# Patient Record
Sex: FEMALE | Race: WHITE | Employment: FULL TIME | URBAN - METROPOLITAN AREA
[De-identification: names, ages, dates, MRNs, and addresses within clinical notes are randomized per-mention and may not be internally consistent; named-entity substitution may affect disease eponyms.]

---

## 2019-06-21 ENCOUNTER — TRANSFERRED RECORDS (OUTPATIENT)
Dept: HEALTH INFORMATION MANAGEMENT | Facility: CLINIC | Age: 34
End: 2019-06-21

## 2019-12-18 ENCOUNTER — TRANSFERRED RECORDS (OUTPATIENT)
Dept: HEALTH INFORMATION MANAGEMENT | Facility: CLINIC | Age: 34
End: 2019-12-18

## 2019-12-19 DIAGNOSIS — R05.3 PERSISTENT COUGH: Primary | ICD-10-CM

## 2019-12-20 NOTE — TELEPHONE ENCOUNTER
RECORDS RECEIVED FROM: in process   DATE RECEIVED: 2.21.20   NOTES STATUS DETAILS   OFFICE NOTE from referring provider In process 12/19/19 per appt notes [facility]   OFFICE NOTE from other specialist In process    DISCHARGE SUMMARY from hospital In process    DISCHARGE REPORT from the ER In process    MEDICATION LIST In process    IMAGING  (NEED IMAGES AND REPORTS)     CT SCAN In process    CHEST XRAY (CXR) In process  Received- 2.21.20- scheduled  Received- [facility]- 1/7/20, 6/21/19,    TESTS     PULMONARY FUNCTION TESTING (PFT) received  12/18/19- [facility]       Action 12.20.19 sv   Action Taken Called pt and LVM about getting records. Will contact pt again on Monday 12/23/19        Action 12.30.19 sv   Action Taken Called pt and LVM about getting info/records, left call back number         Action 1.9.20 sv   Action Taken Called pt for the 3rd time and LVM and direct call back         Action 1.10.20 sv   Action Taken Records request sent to [facility] [phone]        Action 1.14.20 sv   Action Taken Received reports from [facility], sent to [facility], images are located at [facility], will contact to get images    7:55am- images received   Message sent to scheduling team to cancel CXR 2.21.20 due to most recent cxr received 1/7/20

## 2020-01-07 ENCOUNTER — TRANSFERRED RECORDS (OUTPATIENT)
Dept: HEALTH INFORMATION MANAGEMENT | Facility: CLINIC | Age: 35
End: 2020-01-07

## 2020-01-21 ENCOUNTER — HOSPITAL ENCOUNTER (OUTPATIENT)
Dept: GENERAL RADIOLOGY | Facility: CLINIC | Age: 35
Discharge: HOME | End: 2020-01-21
Attending: OTOLARYNGOLOGY | Admitting: OTOLARYNGOLOGY
Payer: COMMERCIAL

## 2020-01-21 DIAGNOSIS — R05.9 COUGH: ICD-10-CM

## 2020-01-21 DIAGNOSIS — R13.10 DYSPHAGIA: ICD-10-CM

## 2020-01-21 PROCEDURE — 74220 X-RAY XM ESOPHAGUS 1CNTRST: CPT

## 2020-01-21 PROCEDURE — 25500045 ZZH RX 255: Performed by: RADIOLOGY

## 2020-01-21 RX ADMIN — ANTACID/ANTIFLATULENT 4 G: 380; 550; 10; 10 GRANULE, EFFERVESCENT ORAL at 08:20

## 2020-02-13 ENCOUNTER — THERAPY VISIT (OUTPATIENT)
Dept: SPEECH THERAPY | Facility: CLINIC | Age: 35
End: 2020-02-13

## 2020-02-13 ENCOUNTER — ANCILLARY PROCEDURE (OUTPATIENT)
Dept: GENERAL RADIOLOGY | Facility: CLINIC | Age: 35
End: 2020-02-13
Attending: OTOLARYNGOLOGY

## 2020-02-13 DIAGNOSIS — R05.9 COUGH: ICD-10-CM

## 2020-02-13 DIAGNOSIS — R13.12 OROPHARYNGEAL DYSPHAGIA: Primary | ICD-10-CM

## 2020-02-13 DIAGNOSIS — R47.02 DYSPHASIA: ICD-10-CM

## 2020-02-13 RX ORDER — BARIUM SULFATE 400 MG/ML
30 SUSPENSION ORAL ONCE
Status: COMPLETED | OUTPATIENT
Start: 2020-02-13 | End: 2020-02-13

## 2020-02-13 RX ADMIN — BARIUM SULFATE 30 ML: 400 SUSPENSION ORAL at 09:53

## 2020-02-13 NOTE — PROGRESS NOTES
"   02/13/20 1000   General Information   Type Of Visit Initial   Referring Physician Carlos A Rubio MD   Orders Evaluate And Treat   Orders Comment SLP clinic eval, videofluoroscopy   Onset Of Illness/injury Or Date Of Surgery 03/01/18   Precautions/limitations No Known Precautions/limitations   Pertinent History of Current Problem/OT: Additional Occupational Profile Info Pt is a 34 year old female with concerns of continuous coughing and throat clearing starting in March 2018 after an \"illness\" and has continued since. Pt reported continuous throat clearing that worsens after eating.  Pt is on a regular diet and expresses \"sticky\" foods more difficult to eat.  Pt reported an occasional senstation of food getting stuck and food going down the wrong tube was expressed.  Pt reported making sure to have water with intake and that she \"chokes on her food more than others.\"  Pt reported having difficulty swallowing pills since she was a child and that she has a sensation of choking while singing.  Pt history negative for recent PNA; pt reports multiple CXR 's due to her symptoms.  As part of her current work up, pt had an esophagram completed which was negative.  Pt plans to pursue further workup with Pulmonology service next week and return to her referring provider (ENT).  Of note, pt observed to have baseline throat clearing behavior.         Clinical Swallow Evaluation   Oral Musculature generally intact   Structural Abnormalities none present   Dentition present and adequate   Mucosal Quality good   Mandibular Strength and Mobility intact   Oral Labial Strength and Mobility WFL   Lingual Strength and Mobility WFL   Velar Elevation intact   Buccal Strength and Mobility intact   Laryngeal Function Cough;Throat clear;Swallow;Voicing initiated   Oral Musculature Comments No concerns, WFL   VFSS Eval: Thin Liquid Texture Trial   Mode of Presentation, Thin Liquid cup;self-fed   Order of Presentation 1, 2, 3, 12, 13, 14 "   Preparatory Phase WFL   Oral Phase, Thin Liquid WFL   Pharyngeal Phase, Thin Liquid WFL   Rosenbek's Penetration Aspiration Scale: Thin Liquid Trial Results 1 - no aspiration, contrast does not enter airway   Diagnostic Statement WFL  (Mild contrast in proximal esophagus with consecutive sips)   VFSS Eval: Nectar Thick Liquid Texture Trial   Mode of Presentation, Nectar cup;self-fed   Order of Presentation 4, 5, 6   Preparatory Phase WFL   Oral Phase, Nectar WFL   Pharyngeal Phase, Gardi WFL   Rosenbek's Penetration Aspiration Scale: Nectar-Thick Liquid Trial Results 1 - no aspiration, contrast does not enter airway   Diagnostic Statement WFL  (Mild contrast in proximal esophagus)   VFSS Eval: Pudding Thick Liquid Texture Trial   Mode of Presentation, Pudding spoon;self-fed   Order of Presentation 7, 8   Preparatory Phase WFL   Oral Phase, Pudding WFL   Pharyngeal Phase, Pudding WFL   Rosenbek's Penetration Aspiration Scale: Pudding-Thick Liquid Trial Results 1 - no aspiration, contrast does not enter airway   Diagnostic Statement WFL  (Mild contrast in proximal esophagus)   VFSS Eval: Solid Food Texture Trial   Mode of Presentation, Solid self-fed   Order of Presentation 9, 10, 11  (11- Barium tablet)   Preparatory Phase WFL   Oral Phase, Solid WFL   Pharyngeal Phase, Solid WFL   Rosenbek's Penetration Aspiration Scale: Solid Food Trial Results 1 - no aspiration, contrast does not enter airway   Diagnostic Statement WFL  (2nd swallow to pass tablet, mild contrast proximal esophagus)   Swallow Compensations   Swallow Compensations No compensations were used   Results No difficulties noted   Educational Assessment   Barriers to Learning No barriers   Esophageal Phase of Swallow   Esophageal sweep performed during today s vidofluoroscopic exam  Yes   Esophageal comments Refer to radiologist report   Swallow Eval: Clinical Impressions   Skilled Criteria for Therapy Intervention No problems identified which require  skilled intervention   Dysphagia Outcome Severity Scale (TROY) Level 7 - TROY   Treatment Diagnosis Oral and pharyngal phases WNL   Diet texture recommendations Regular diet   Demonstrates Need for Referral to Another Service other (see comments)  ([facility])   Risks and Benefits of Treatment have been explained. Yes   Clinical Impression Comments Oral and pharyngeal stages of swallowing are within normal limits.  Oral mechanism was unremarkable with the exception of baseline throat clearing.  Under fluoroscopy, no airway penetration or aspiration was noted across textures; continuous throat clearing was observed despite lack of impingement to the airway.  The pharyngeal swallow response was timely and strong with no residue remaining in the pharynx. Minimal to mild contrast remained in proximal esophagus after all consistencies.  Recommended to continue with regular diet and thin liquids.  Concern for irritable larynx/chronic throat clearing is present would recommended to follow up with [facility] for evaluation and possible treatment.  There has been a question of silent reflux, given this, it is recommended that pt be mindful of eating high acid foods and refraining from eating prior to reclining.  Pt was educated on these recommendations and rationale.  Pt is not demonstrating a need for further dysphagia intervention from this provider.     Total Session Time   SLP Eval: oral/pharyngeal swallow function, clinical minutes (60076) 15   SLP Eval: VideoFluoroscopic Swallow function Minutes ([id number]) 86

## 2020-02-21 ENCOUNTER — PRE VISIT (OUTPATIENT)
Dept: PULMONOLOGY | Facility: CLINIC | Age: 35
End: 2020-02-21

## 2020-02-21 ENCOUNTER — OFFICE VISIT (OUTPATIENT)
Dept: PULMONOLOGY | Facility: CLINIC | Age: 35
End: 2020-02-21
Attending: INTERNAL MEDICINE
Payer: COMMERCIAL

## 2020-02-21 VITALS
RESPIRATION RATE: 17 BRPM | HEART RATE: 73 BPM | BODY MASS INDEX: 25.84 KG/M2 | SYSTOLIC BLOOD PRESSURE: 108 MMHG | DIASTOLIC BLOOD PRESSURE: 74 MMHG | HEIGHT: 67 IN | OXYGEN SATURATION: 97 %

## 2020-02-21 DIAGNOSIS — R05.9 COUGH: Primary | ICD-10-CM

## 2020-02-21 PROCEDURE — G0463 HOSPITAL OUTPT CLINIC VISIT: HCPCS | Mod: ZF

## 2020-02-21 RX ORDER — AZELASTINE 1 MG/ML
1 SPRAY, METERED NASAL 2 TIMES DAILY
Qty: 2 BOTTLE | Refills: 1 | Status: SHIPPED | OUTPATIENT
Start: 2020-02-21

## 2020-02-21 RX ORDER — LORATADINE 10 MG/1
10 TABLET ORAL DAILY
COMMUNITY

## 2020-02-21 RX ORDER — CETIRIZINE HYDROCHLORIDE 10 MG/1
10 TABLET ORAL DAILY
COMMUNITY

## 2020-02-21 RX ORDER — CEPHALEXIN 500 MG/1
CAPSULE ORAL
COMMUNITY
Start: 2020-02-16

## 2020-02-21 RX ORDER — ALBUTEROL SULFATE 90 UG/1
2 AEROSOL, METERED RESPIRATORY (INHALATION) EVERY 6 HOURS
Qty: 30 INHALER | Refills: 3 | Status: SHIPPED | OUTPATIENT
Start: 2020-02-21

## 2020-02-21 ASSESSMENT — PAIN SCALES - GENERAL: PAINLEVEL: NO PAIN (0)

## 2020-02-21 NOTE — PATIENT INSTRUCTIONS
We will start you on acid suppression medication, to see if it helps your symptoms.   Continue your allergy medication and nasal flonase irrigation.   Will see you back in 2 months.

## 2020-02-21 NOTE — LETTER
"2/21/2020       RE: Lori Turjillo  [address]  [address]  [facility] [id number]     Dear Colleague,    Thank you for referring your patient, Lori Trujillo, to the [facility] at [facility]. Please see a copy of my visit note below.    Pulmonary Clinic Initial Visit Note    CC: \"Chronic cough\"      HPI:   Patient with no known prior pulmonary disease is presenting with complain of cough. Patient reports that she has had cough for about 11 months. Her cough of late has been improving, however still continues to have issues with throat clearing. She has known allergic rhinitis, which can become hard to control over the summer. She has had normal PFT's in the past, has tried inhalers in the past with minimal improvement. She denies any reflux sxs. She denies smoking or exposure to inhalation toxins. 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         PMH:  No past medical history on file.    Allergies:  Allergies   Allergen Reactions     Penicillins Nausea and Vomiting       Social History:  Social History     Socioeconomic History     Marital status: Single     Spouse name: Not on file     Number of children: Not on file     Years of education: Not on file     Highest education level: Not on file   Occupational History     Not on file   Social Needs     Financial resource strain: Not on file     Food insecurity:     Worry: Not on file     Inability: Not on file     Transportation needs:     Medical: Not on file     Non-medical: Not on file   Tobacco Use     Smoking status: Never Smoker   Substance and Sexual Activity     Alcohol use: Yes     Drug use: No     Sexual activity: Yes     Partners: Male   Lifestyle     Physical activity:     Days per week: Not on file     Minutes per session: Not on file     Stress: Not on file   Relationships     Social connections:     Talks on " phone: Not on file     Gets together: Not on file     Attends Synagogue service: Not on file     Active member of club or organization: Not on file     Attends meetings of clubs or organizations: Not on file     Relationship status: Not on file     Intimate partner violence:     Fear of current or ex partner: Not on file     Emotionally abused: Not on file     Physically abused: Not on file     Forced sexual activity: Not on file   Other Topics Concern     Parent/sibling w/ CABG, MI or angioplasty before 65F 55M? Not Asked   Social History Narrative     Not on file       Medications:  Current Outpatient Medications   Medication Sig Dispense Refill     Albuterol Sulfate (PROAIR HFA) 108 (90 BASE) MCG/ACT AERS Inhale 2 puffs into the lungs every 4 hours as needed. 1 Inhaler 0     CIPROFLOXACIN PO Take  by mouth.         DiphenhydrAMINE HCl (BENADRYL PO) Take  by mouth.         guaiFENesin/codeine (ROBITUSSIN AC) 100-10 MG/5ML SOLN Take 5-10 mLs by mouth every 4 hours as needed. 120 mL 0     IBUPROFEN PO Take  by mouth.         meclizine (ANTIVERT) 12.5 MG tablet Take 2 tablets by mouth 3 times daily as needed for dizziness. 20 tablet 0     meclizine (ANTIVERT) 12.5 MG tablet Take 2 tablets by mouth 3 times daily as needed for dizziness. 30 tablet 0     meclizine (ANTIVERT) 12.5 MG tablet Take 2 tablets by mouth 3 times daily as needed for dizziness. 30 tablet 0     meclizine 50 MG TABS Take 25 mg by mouth 3 times daily as needed. 20 tablet 0     NO ACTIVE MEDICATIONS .       Sertraline HCl (ZOLOFT PO) Take  by mouth daily.           Family History:  No family history on file.    ROS: Complete 10 point ROS negative unless mentioned in HPI    Physical Exam:  There were no vitals taken for this visit.    General: Sitting in the chair in NAD  HEENT: anicteric, moist mucosa  Neck: no palpable lymphadenopathy, no JVD noted  Chest: CTAB, no wheezing  Cardiac: RRR no murmurs  Abdomen: Soft, flat, non tender, active  BS  Extremities: No LE Edema  Neuro: A&Ox3, no focal defecits  Skin: no rash noted        Labs and Radiology:  Reviewed      PFT's:  No flowsheet data found.      Assessment and Plan:  Patient allergic rhinitis who is presentation with cough for about 11 months. Recently her cough has been minimal, but continue to have throat clearing. CXR and PFT's within normal range. Had recent speech/swallow with no abnormalities. She continue to have post-nasal drip which is likely cause of her cough. This may be exacerbated by underlying reflux. Asthma is less likely, however remains a possibility. Will start her on CIELO, ICS & PPI. Will follow up in 2 months to check for improvement.     # Cough (improving)     Plan   - CIELO/ICS   - PPI & life style modification   - Follow up in 2 months       Seen and staffed with Dr. Hendrickson.  Giuseppe Duron MD  Pulmonary and Critical Care Fellow      Attending statement:    The patient was seen and examined by me with Dr Chin.  The case was discussed at length.   Vitals, lab results and imaging from today were reviewed.  The note reflects our joint assessment and plan.  Chronic persistent troublesome cough refractory to interventions although some improvement recently.  Has high suspicion for postnasal drainage and cough variant asthma as possibilities.  Reflux also possible.  We will do the above and follow-up in 6 to 8 weeks. Astelin or nasal ipratropium could be options.    Nico Hendrickson MD  Pulmonary, Critical Care Medicine

## 2020-02-21 NOTE — PROGRESS NOTES
"Pulmonary Clinic Initial Visit Note    CC: \"Chronic cough\"      HPI:   Patient with no known prior pulmonary disease is presenting with complain of cough. Patient reports that she has had cough for about 11 months. Her cough of late has been improving, however still continues to have issues with throat clearing. She has known allergic rhinitis, which can become hard to control over the summer. She has had normal PFT's in the past, has tried inhalers in the past with minimal improvement. She denies any reflux sxs. She denies smoking or exposure to inhalation toxins. 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         PMH:  No past medical history on file.    Allergies:  Allergies   Allergen Reactions     Penicillins Nausea and Vomiting       Social History:  Social History     Socioeconomic History     Marital status: Single     Spouse name: Not on file     Number of children: Not on file     Years of education: Not on file     Highest education level: Not on file   Occupational History     Not on file   Social Needs     Financial resource strain: Not on file     Food insecurity:     Worry: Not on file     Inability: Not on file     Transportation needs:     Medical: Not on file     Non-medical: Not on file   Tobacco Use     Smoking status: Never Smoker   Substance and Sexual Activity     Alcohol use: Yes     Drug use: No     Sexual activity: Yes     Partners: Male   Lifestyle     Physical activity:     Days per week: Not on file     Minutes per session: Not on file     Stress: Not on file   Relationships     Social connections:     Talks on phone: Not on file     Gets together: Not on file     Attends Roman [employer] service: Not on file     Active member of club or organization: Not on file     Attends meetings of clubs or organizations: Not on file     Relationship status: Not on file     Intimate partner violence:     Fear of current or ex partner: Not on file     " Emotionally abused: Not on file     Physically abused: Not on file     Forced sexual activity: Not on file   Other Topics Concern     Parent/sibling w/ CABG, MI or angioplasty before 65F 55M? Not Asked   Social History Narrative     Not on file       Medications:  Current Outpatient Medications   Medication Sig Dispense Refill     Albuterol Sulfate (PROAIR HFA) 108 (90 BASE) MCG/ACT AERS Inhale 2 puffs into the lungs every 4 hours as needed. 1 Inhaler 0     CIPROFLOXACIN PO Take  by mouth.         DiphenhydrAMINE HCl (BENADRYL PO) Take  by mouth.         guaiFENesin/codeine (ROBITUSSIN AC) 100-10 MG/5ML SOLN Take 5-10 mLs by mouth every 4 hours as needed. 120 mL 0     IBUPROFEN PO Take  by mouth.         meclizine (ANTIVERT) 12.5 MG tablet Take 2 tablets by mouth 3 times daily as needed for dizziness. 20 tablet 0     meclizine (ANTIVERT) 12.5 MG tablet Take 2 tablets by mouth 3 times daily as needed for dizziness. 30 tablet 0     meclizine (ANTIVERT) 12.5 MG tablet Take 2 tablets by mouth 3 times daily as needed for dizziness. 30 tablet 0     meclizine 50 MG TABS Take 25 mg by mouth 3 times daily as needed. 20 tablet 0     NO ACTIVE MEDICATIONS .       Sertraline HCl (ZOLOFT PO) Take  by mouth daily.           Family History:  No family history on file.    ROS: Complete 10 point ROS negative unless mentioned in HPI    Physical Exam:  There were no vitals taken for this visit.    General: Sitting in the chair in NAD  HEENT: anicteric, moist mucosa  Neck: no palpable lymphadenopathy, no JVD noted  Chest: CTAB, no wheezing  Cardiac: RRR no murmurs  Abdomen: Soft, flat, non tender, active BS  Extremities: No LE Edema  Neuro: A&Ox3, no focal defecits  Skin: no rash noted        Labs and Radiology:  Reviewed      PFT's:  No flowsheet data found.      Assessment and Plan:  Patient allergic rhinitis who is presentation with cough for about 11 months. Recently her cough has been minimal, but continue to have throat clearing.  CXR and PFT's within normal range. Had recent speech/swallow with no abnormalities. She continue to have post-nasal drip which is likely cause of her cough. This may be exacerbated by underlying reflux. Asthma is less likely, however remains a possibility. Will start her on CIELO, ICS & PPI. Will follow up in 2 months to check for improvement.     # Cough (improving)     Plan   - CIELO/ICS   - PPI & life style modification   - Follow up in 2 months       Seen and staffed with Dr. Hendrickson.  Giuseppe Duron MD  Pulmonary and Critical Care Fellow      Attending statement:    The patient was seen and examined by me with Dr Chin.  The case was discussed at length.   Vitals, lab results and imaging from today were reviewed.  The note reflects our joint assessment and plan.  Chronic persistent troublesome cough refractory to interventions although some improvement recently.  Has high suspicion for postnasal drainage and cough variant asthma as possibilities.  Reflux also possible.  We will do the above and follow-up in 6 to 8 weeks. Astelin or nasal ipratropium could be options.    Nico Hendrickson MD  Pulmonary, Critical Care Medicine

## 2020-02-21 NOTE — NURSING NOTE
Chief Complaint   Patient presents with     Consult     Persistent cough     Medications reviewed and vital signs taken.   Abelino Gibbs CMA

## 2020-12-26 ENCOUNTER — NURSE TRIAGE (OUTPATIENT)
Dept: NURSING | Facility: CLINIC | Age: 35
End: 2020-12-26

## 2020-12-26 NOTE — TELEPHONE ENCOUNTER
Triage Call:  Pt started having diarrhea on Wednesday night, pt has deep red blood on her rectum Wednesday night and Thursday morning. Currently no blood. However, pt continues with diarrhea and excruciating pain with passing gas or a bowel movement. Pt is currently a 1/10 but states its about a 8/10 with gas or a bowel movement. Pt has had 3 episodes of diarrhea today. Pt is on a diet of bananas and rice, stool is a light brown in color. Pt has a headache, has been drinking pedialyte. Pt states she has lost weight. Pt was advised to be seen in in the next 24 hours. Pt is going to go to an urgent care.       Additional Information    Negative: Shock suspected (e.g., cold/pale/clammy skin, too weak to stand, low BP, rapid pulse)    Negative: Difficult to awaken or acting confused (e.g., disoriented, slurred speech)    Negative: Sounds like a life-threatening emergency to the triager    Negative: Vomiting also present and worse than the diarrhea    Negative: [1] Blood in stool AND [2] without diarrhea    Negative: Diarrhea in a cancer patient who is currently (or recently) receiving chemotherapy or radiation therapy, or cancer patient who has metastatic or end-stage cancer and is receiving palliative care    Negative: [1] SEVERE abdominal pain (e.g., excruciating) AND [2] present > 1 hour    Negative: [1] SEVERE abdominal pain AND [2] age > 60    Negative: [1] Blood in the stool AND [2] moderate or large amount of blood    Negative: Black or tarry bowel movements  (Exception: chronic-unchanged  black-grey bowel movements AND is taking iron pills or Pepto-bismol)    Negative: [1] Drinking very little AND [2] dehydration suspected (e.g., no urine > 12 hours, very dry mouth, very lightheaded)    Negative: Patient sounds very sick or weak to the triager    Negative: [1] SEVERE diarrhea (e.g., 7 or more times / day more than normal) AND [2] age > 60 years    Negative: [1] Constant abdominal pain AND [2] present > 2  hours    Negative: [1] Fever > 103 F (39.4 C) AND [2] not able to get the fever down using Fever Care Advice    Negative: [1] SEVERE diarrhea (e.g., 7 or more times / day more than normal) AND [2] present > 24 hours (1 day)    Negative: [1] MODERATE diarrhea (e.g., 4-6 times / day more than normal) AND [2] age > 70 years    Negative: [1] MODERATE diarrhea (e.g., 4-6 times / day more than normal) AND [2] present > 48 hours (2 days)    Negative: Fever > 101 F (38.3 C)    Negative: Fever present > 3 days (72 hours)    Negative: Abdominal pain  (Exception: Pain clears with each passage of diarrhea stool)    [1] Blood in the stool AND [2] small amount of blood   (Exception: only on toilet paper. Reason: diarrhea can cause rectal irritation with blood on wiping)    Protocols used: DIARRHEA-A-AH

## 2022-11-02 ENCOUNTER — TRANSFERRED RECORDS (OUTPATIENT)
Dept: HEALTH INFORMATION MANAGEMENT | Facility: CLINIC | Age: 37
End: 2022-11-02

## 2022-11-02 LAB
ALT SERPL-CCNC: 16 IU/L (ref 0–32)
AST SERPL-CCNC: 16 IU/L (ref 0–40)
CREATININE (EXTERNAL): 0.62 MG/DL (ref 0.57–1)
GFR ESTIMATED (EXTERNAL): 117 ML/MIN/1.73
GLUCOSE (EXTERNAL): 90 MG/DL (ref 70–99)
POTASSIUM (EXTERNAL): 4.1 MMOL/L (ref 3.5–5.2)
TSH SERPL-ACNC: 1.22 UIU/ML (ref 0.45–4.5)

## 2022-11-09 ENCOUNTER — TRANSFERRED RECORDS (OUTPATIENT)
Dept: HEALTH INFORMATION MANAGEMENT | Facility: CLINIC | Age: 37
End: 2022-11-09

## 2022-11-11 ENCOUNTER — OFFICE VISIT (OUTPATIENT)
Dept: CARDIOLOGY | Facility: CLINIC | Age: 37
End: 2022-11-11
Payer: COMMERCIAL

## 2022-11-11 VITALS
DIASTOLIC BLOOD PRESSURE: 64 MMHG | WEIGHT: 230 LBS | HEART RATE: 72 BPM | SYSTOLIC BLOOD PRESSURE: 112 MMHG | RESPIRATION RATE: 16 BRPM | OXYGEN SATURATION: 98 % | BODY MASS INDEX: 36.02 KG/M2

## 2022-11-11 DIAGNOSIS — R07.2 PRECORDIAL PAIN: Primary | ICD-10-CM

## 2022-11-11 PROCEDURE — 99204 OFFICE O/P NEW MOD 45 MIN: CPT | Performed by: INTERNAL MEDICINE

## 2022-11-11 RX ORDER — PHENOL 1.4 %
10 AEROSOL, SPRAY (ML) MUCOUS MEMBRANE
COMMUNITY

## 2022-11-11 RX ORDER — HYDROXYZINE HYDROCHLORIDE 25 MG/1
TABLET, FILM COATED ORAL
COMMUNITY

## 2022-11-11 NOTE — PROGRESS NOTES
"    [facility]  [phone]          Assessment/Recommendations   Patient with chest discomfort over the last couple of weeks, which seems quite inflammatory in nature.  I can partially reproduce some of the discomfort by palpating her left upper chest today.  I suspect she has some inflammatory issues which could be related to COVID infections?    I cannot exclude the possibility of pericarditis and would like to get an echocardiogram to look for pericardial effusion and also a look at her left ventricular systolic function in case she has had myocarditis.    I have asked her to take 650 mg of aspirin 3 times a day with food for 5 days to see if the inflammation will improve.    We will get back to her with the results of the echocardiogram and any further recommendations thereafter.    Thank you for allowing us to participate in her care.       History of Present Illness/Subjective    Ms. Lori Trujillo is a 37 year old female with recent onset of headache as well as chest discomfort and arm discomfort and shoulder discomfort and neck discomfort.  She says that it started out been constant in nature and now it seems to be intermittent but she has had a fair amount of time on and off throughout the day.  The discomfort moves around.  It is sometimes in her left upper anterior chest sometimes in the right, can be in the right neck and shoulder area and has even been in her neck and her jaw area.  Deep breathing does not seem to have much effect, physical activity does not have much of affect and position does not have much of an effect.  It does feel worse if she pushes on it in general.  She does not think it causes her to be short of breath but she admits to being very sedentary and \"out of shape\".    She denies orthopnea, paroxysmal nocturnal dyspnea, has had some peripheral edema but much in a mild way.  No syncopal or near syncopal episodes but she has been intermittently lightheaded.  " She denies palpitations.    She is not diabetic, has really never smoked, is not on medications for cholesterol, has not been treated for hypertension but her mother had a heart attack with a stent placement at age 63.    She works as an  and is not wild about her work.  She is not  and does not have children.    ECG: Personally reviewed.  Sinus rhythm with incomplete right bundle branch block.     Recent chest x-ray at outside clinic is unremarkable.       Physical Examination Review of Systems   There were no vitals taken for this visit.  There is no height or weight on file to calculate BMI.  Wt Readings from Last 3 Encounters:   09/22/12 74.8 kg (165 lb)   12/15/09 78.9 kg (174 lb)     General Appearance:   Alert, cooperative and in no acute distress.   ENT/Mouth: Patient wearing a mask.      EYES:  no scleral icterus, normal conjunctivae   Neck: JVP normal. No Hepatojugular reflux. Thyroid not visualized.   Chest/Lungs:   Lungs are clear to auscultation, equal chest wall expansion.  Left upper chest wall anteriorly is tender to touch.  This reproduces and makes the current pain worse.   Cardiovascular:   S1, S2 without murmur ,clicks or rubs. Brachial, radial and posterior tibial pulses are intact and symetric. No carotid bruits noted   Abdomen:  Nontender. BS+.   Extremities: No cyanosis, clubbing or edema   Skin: no xanthelasma, warm.    Neurologic: normal arm movement bilateral, no tremors     Psychiatric: Appropriate affect.                                                  Medical History  Surgical History Family History Social History   No past medical history on file. No past surgical history on file. No family history on file. Social History     Socioeconomic History     Marital status: Single     Spouse name: Not on file     Number of children: Not on file     Years of education: Not on file     Highest education level: Not on file   Occupational History     Not on file   Tobacco Use      Smoking status: Never     Smokeless tobacco: Never   Substance and Sexual Activity     Alcohol use: Yes     Comment: rare     Drug use: No     Sexual activity: Yes     Partners: Male   Other Topics Concern     Parent/sibling w/ CABG, MI or angioplasty before 65F 55M? Not Asked   Social History Narrative     Not on file     Social Determinants of Health     Financial Resource Strain: Not on file   Food Insecurity: Not on file   Transportation Needs: Not on file   Physical Activity: Not on file   Stress: Not on file   Social Connections: Not on file   Intimate Partner Violence: Not on file   Housing Stability: Not on file          Medications  Allergies   Current Outpatient Medications   Medication Sig Dispense Refill     IBUPROFEN PO Take  by mouth.         progesterone 100 MG PO capsule Take 100 mg by mouth At Bedtime 100-200 mg at bedtime       albuterol (PROAIR HFA/PROVENTIL HFA/VENTOLIN HFA) 108 (90 Base) MCG/ACT inhaler Inhale 2 puffs into the lungs every 6 hours (Patient not taking: Reported on 11/11/2022) 30 Inhaler 3     Albuterol Sulfate (PROAIR HFA) 108 (90 BASE) MCG/ACT AERS Inhale 2 puffs into the lungs every 4 hours as needed. (Patient not taking: Reported on 2/21/2020) 1 Inhaler 0     azelastine (ASTELIN) 0.1 % nasal spray Spray 1 spray into both nostrils 2 times daily (Patient not taking: Reported on 11/11/2022) 2 Bottle 1     cephALEXin 500 MG PO capsule  (Patient not taking: Reported on 11/11/2022)       cetirizine 10 MG PO tablet Take 10 mg by mouth daily (Patient not taking: Reported on 11/11/2022)       DiphenhydrAMINE HCl (BENADRYL PO) Take by mouth every 6 hours as needed  (Patient not taking: Reported on 11/11/2022)       doxylamine 25 MG PO TABS tablet Take 25 mg by mouth At Bedtime (Patient not taking: Reported on 11/11/2022)       fluticasone-salmeterol (ADVAIR) 250-50 MCG/DOSE inhaler Inhale 1 puff into the lungs every 12 hours (Patient not taking: Reported on 11/11/2022) 3 Inhaler 3      guaiFENesin/codeine (ROBITUSSIN AC) 100-10 MG/5ML SOLN Take 5-10 mLs by mouth every 4 hours as needed. (Patient not taking: Reported on 2/21/2020) 120 mL 0     hydrOXYzine (ATARAX) 25 MG tablet hydroxyzine HCl 25 mg tablet   TAKE 1 TO 2 TABLETS BY MOUTH UP TO FOUR TIMES DAILY AS NEEDED FOR ANXIETY       loratadine 10 MG PO tablet Take 10 mg by mouth daily (Patient not taking: Reported on 11/11/2022)       meclizine (ANTIVERT) 12.5 MG tablet Take 2 tablets by mouth 3 times daily as needed for dizziness. (Patient not taking: Reported on 11/11/2022) 20 tablet 0     meclizine 50 MG TABS Take 25 mg by mouth 3 times daily as needed. (Patient not taking: Reported on 11/11/2022) 20 tablet 0     omeprazole (PRILOSEC) 20 MG DR capsule Take 2 capsules (40 mg) by mouth daily (Patient not taking: Reported on 11/11/2022) 30 capsule 1     Sertraline HCl (ZOLOFT PO) Take  by mouth daily.   (Patient not taking: Reported on 11/11/2022)      No Known Allergies      Lab Results    Chemistry/lipid CBC Cardiac Enzymes/BNP/TSH/INR   Lab Results   Component Value Date    BUN 11 09/22/2012     09/22/2012    CO2 25 09/22/2012    Lab Results   Component Value Date    WBC 6.1 09/22/2012    HGB 13.5 09/22/2012    HCT 39.2 09/22/2012    MCV 85 09/22/2012     09/22/2012    No results found for: CKTOTAL, CKMB, TROPONINI, BNP, TSH, INR

## 2022-11-11 NOTE — LETTER
11/11/2022    Lydia Cox PA-C  [address]  [facility] [address]    RE: Lori Trujillo       Dear Colleague,     I had the pleasure of seeing Lori Trujillo in the [facility].      [facility]  [phone]          Assessment/Recommendations   Patient with chest discomfort over the last couple of weeks, which seems quite inflammatory in nature.  I can partially reproduce some of the discomfort by palpating her left upper chest today.  I suspect she has some inflammatory issues which could be related to COVID infections?    I cannot exclude the possibility of pericarditis and would like to get an echocardiogram to look for pericardial effusion and also a look at her left ventricular systolic function in case she has had myocarditis.    I have asked her to take 650 mg of aspirin 3 times a day with food for 5 days to see if the inflammation will improve.    We will get back to her with the results of the echocardiogram and any further recommendations thereafter.    Thank you for allowing us to participate in her care.       History of Present Illness/Subjective    Ms. Lori Trujillo is a 37 year old female with recent onset of headache as well as chest discomfort and arm discomfort and shoulder discomfort and neck discomfort.  She says that it started out been constant in nature and now it seems to be intermittent but she has had a fair amount of time on and off throughout the day.  The discomfort moves around.  It is sometimes in her left upper anterior chest sometimes in the right, can be in the right neck and shoulder area and has even been in her neck and her jaw area.  Deep breathing does not seem to have much effect, physical activity does not have much of affect and position does not have much of an effect.  It does feel worse if she pushes on it in general.  She does not think it causes her to be short of breath but she admits to being very  "sedentary and \"out of shape\".    She denies orthopnea, paroxysmal nocturnal dyspnea, has had some peripheral edema but much in a mild way.  No syncopal or near syncopal episodes but she has been intermittently lightheaded.  She denies palpitations.    She is not diabetic, has really never smoked, is not on medications for cholesterol, has not been treated for hypertension but her mother had a heart attack with a stent placement at age 63.    She works as an  and is not wild about her work.  She is not  and does not have children.    ECG: Personally reviewed.  Sinus rhythm with incomplete right bundle branch block.     Recent chest x-ray at outside clinic is unremarkable.       Physical Examination Review of Systems   There were no vitals taken for this visit.  There is no height or weight on file to calculate BMI.  Wt Readings from Last 3 Encounters:   09/22/12 74.8 kg (165 lb)   12/15/09 78.9 kg (174 lb)     General Appearance:   Alert, cooperative and in no acute distress.   ENT/Mouth: Patient wearing a mask.      EYES:  no scleral icterus, normal conjunctivae   Neck: JVP normal. No Hepatojugular reflux. Thyroid not visualized.   Chest/Lungs:   Lungs are clear to auscultation, equal chest wall expansion.  Left upper chest wall anteriorly is tender to touch.  This reproduces and makes the current pain worse.   Cardiovascular:   S1, S2 without murmur ,clicks or rubs. Brachial, radial and posterior tibial pulses are intact and symetric. No carotid bruits noted   Abdomen:  Nontender. BS+.   Extremities: No cyanosis, clubbing or edema   Skin: no xanthelasma, warm.    Neurologic: normal arm movement bilateral, no tremors     Psychiatric: Appropriate affect.                                                  Medical History  Surgical History Family History Social History   No past medical history on file. No past surgical history on file. No family history on file. Social History     Socioeconomic History     " Marital status: Single     Spouse name: Not on file     Number of children: Not on file     Years of education: Not on file     Highest education level: Not on file   Occupational History     Not on file   Tobacco Use     Smoking status: Never     Smokeless tobacco: Never   Substance and Sexual Activity     Alcohol use: Yes     Comment: rare     Drug use: No     Sexual activity: Yes     Partners: Male   Other Topics Concern     Parent/sibling w/ CABG, MI or angioplasty before 65F 55M? Not Asked   Social History Narrative     Not on file     Social Determinants of Health     Financial Resource Strain: Not on file   Food Insecurity: Not on file   Transportation Needs: Not on file   Physical Activity: Not on file   Stress: Not on file   Social Connections: Not on file   Intimate Partner Violence: Not on file   Housing Stability: Not on file          Medications  Allergies   Current Outpatient Medications   Medication Sig Dispense Refill     IBUPROFEN PO Take  by mouth.         progesterone 100 MG PO capsule Take 100 mg by mouth At Bedtime 100-200 mg at bedtime       albuterol (PROAIR HFA/PROVENTIL HFA/VENTOLIN HFA) 108 (90 Base) MCG/ACT inhaler Inhale 2 puffs into the lungs every 6 hours (Patient not taking: Reported on 11/11/2022) 30 Inhaler 3     Albuterol Sulfate (PROAIR HFA) 108 (90 BASE) MCG/ACT AERS Inhale 2 puffs into the lungs every 4 hours as needed. (Patient not taking: Reported on 2/21/2020) 1 Inhaler 0     azelastine (ASTELIN) 0.1 % nasal spray Spray 1 spray into both nostrils 2 times daily (Patient not taking: Reported on 11/11/2022) 2 Bottle 1     cephALEXin 500 MG PO capsule  (Patient not taking: Reported on 11/11/2022)       cetirizine 10 MG PO tablet Take 10 mg by mouth daily (Patient not taking: Reported on 11/11/2022)       DiphenhydrAMINE HCl (BENADRYL PO) Take by mouth every 6 hours as needed  (Patient not taking: Reported on 11/11/2022)       doxylamine 25 MG PO TABS tablet Take 25 mg by mouth At  Bedtime (Patient not taking: Reported on 11/11/2022)       fluticasone-salmeterol (ADVAIR) 250-50 MCG/DOSE inhaler Inhale 1 puff into the lungs every 12 hours (Patient not taking: Reported on 11/11/2022) 3 Inhaler 3     guaiFENesin/codeine (ROBITUSSIN AC) 100-10 MG/5ML SOLN Take 5-10 mLs by mouth every 4 hours as needed. (Patient not taking: Reported on 2/21/2020) 120 mL 0     hydrOXYzine (ATARAX) 25 MG tablet hydroxyzine HCl 25 mg tablet   TAKE 1 TO 2 TABLETS BY MOUTH UP TO FOUR TIMES DAILY AS NEEDED FOR ANXIETY       loratadine 10 MG PO tablet Take 10 mg by mouth daily (Patient not taking: Reported on 11/11/2022)       meclizine (ANTIVERT) 12.5 MG tablet Take 2 tablets by mouth 3 times daily as needed for dizziness. (Patient not taking: Reported on 11/11/2022) 20 tablet 0     meclizine 50 MG TABS Take 25 mg by mouth 3 times daily as needed. (Patient not taking: Reported on 11/11/2022) 20 tablet 0     omeprazole (PRILOSEC) 20 MG DR capsule Take 2 capsules (40 mg) by mouth daily (Patient not taking: Reported on 11/11/2022) 30 capsule 1     Sertraline HCl (ZOLOFT PO) Take  by mouth daily.   (Patient not taking: Reported on 11/11/2022)      No Known Allergies      Lab Results    Chemistry/lipid CBC Cardiac Enzymes/BNP/TSH/INR   Lab Results   Component Value Date    BUN 11 09/22/2012     09/22/2012    CO2 25 09/22/2012    Lab Results   Component Value Date    WBC 6.1 09/22/2012    HGB 13.5 09/22/2012    HCT 39.2 09/22/2012    MCV 85 09/22/2012     09/22/2012    No results found for: CKTOTAL, CKMB, TROPONINI, BNP, TSH, INR             Thank you for allowing me to participate in the care of your patient.      Sincerely,   Gustavo Browne MD   [facility]  cc: Referred Self,

## 2022-11-11 NOTE — PATIENT INSTRUCTIONS
[facility]  [phone]    Lori Trujillo,    It was a pleasure to see you today at the [facility].     My recommendations after this visit include:    1.  We will get an echocardiogram to see if there is any inflammation of the pericardium and to also check the pumping function of your heart.  We will call you with the results.    2.  Please try 650 mg of aspirin 3 times a day with meals for 5 days in a row.  This is to see if that reduces inflammation and helps her symptoms of chest discomfort and shoulder discomfort and neck discomfort.  If questions, please call Lianna Núñez RN at [phone].        Gustavo Browne

## 2022-11-17 ENCOUNTER — HOSPITAL ENCOUNTER (OUTPATIENT)
Dept: CARDIOLOGY | Facility: CLINIC | Age: 37
Discharge: HOME | End: 2022-11-17
Attending: INTERNAL MEDICINE | Admitting: INTERNAL MEDICINE
Payer: COMMERCIAL

## 2022-11-17 DIAGNOSIS — R07.2 PRECORDIAL PAIN: ICD-10-CM

## 2022-11-17 LAB — LVEF ECHO: NORMAL

## 2022-11-17 PROCEDURE — 93306 TTE W/DOPPLER COMPLETE: CPT | Mod: 26 | Performed by: INTERNAL MEDICINE

## 2022-11-17 PROCEDURE — 93306 TTE W/DOPPLER COMPLETE: CPT

## 2023-03-24 ENCOUNTER — LAB REQUISITION (OUTPATIENT)
Dept: LAB | Facility: CLINIC | Age: 38
End: 2023-03-24

## 2023-03-24 DIAGNOSIS — Z11.3 ENCOUNTER FOR SCREENING FOR INFECTIONS WITH A PREDOMINANTLY SEXUAL MODE OF TRANSMISSION: ICD-10-CM

## 2023-03-24 PROCEDURE — 87389 HIV-1 AG W/HIV-1&-2 AB AG IA: CPT | Performed by: NURSE PRACTITIONER

## 2023-03-27 LAB — HIV 1+2 AB+HIV1 P24 AG SERPL QL IA: NONREACTIVE
